# Patient Record
(demographics unavailable — no encounter records)

---

## 2024-11-22 NOTE — PLAN
[FreeTextEntry1] : Patient to follow up in 1 year for annual GYN exam Mammogram and US breast due: 1/25 Colonoscopy due: 07/25 per [facility] md Bone density due: 55 Pap ordered Hemoccult ordered All questions answered, patient agreeable with plan.

## 2024-11-22 NOTE — PHYSICAL EXAM
[Chaperone Present] : A chaperone was present in the examining room during all aspects of the physical examination [Appropriately responsive] : appropriately responsive [Alert] : alert [No Acute Distress] : no acute distress [No Lymphadenopathy] : no lymphadenopathy [Soft] : soft [Non-tender] : non-tender [Non-distended] : non-distended [No HSM] : No HSM [No Lesions] : no lesions [No Mass] : no mass [Oriented x3] : oriented x3 [Examination Of The Breasts] : a normal appearance [No Masses] : no breast masses were palpable [Labia Majora] : normal [Labia Minora] : normal [Uterine Adnexae] : normal [Normal rectal exam] : was normal [Normal Brown Stool] : was normal and brown [Normal] : was normal [None] : there was no rectal mass  [[id number]] : A chaperone was present during the pelvic exam. [FreeTextEntry2] : Pamela Lentz NP student [Internal Hemorrhoid] : no internal hemorrhoids were present [External Hemorrhoid] : no external hemorrhoids were present [Skin Tags] : no residual hemorrhoidal skin tags

## 2024-11-22 NOTE — HISTORY OF PRESENT ILLNESS
[FreeTextEntry1] : Patient is a 46 yo female here today for annual visit. She denies any GYN complaints at this time. Monthly menses.  Pt is not preventing pregnancy at this time, hx of infertility. Has 2 embryos in storage however does not desire pregnancy